# Patient Record
Sex: FEMALE | Race: WHITE | HISPANIC OR LATINO | Employment: FULL TIME | URBAN - METROPOLITAN AREA
[De-identification: names, ages, dates, MRNs, and addresses within clinical notes are randomized per-mention and may not be internally consistent; named-entity substitution may affect disease eponyms.]

---

## 2024-06-17 PROBLEM — R87.613 PAP SMEAR ABNORMALITY OF CERVIX WITH HGSIL: Status: ACTIVE | Noted: 2024-06-17

## 2024-11-13 ENCOUNTER — TELEPHONE (OUTPATIENT)
Dept: FAMILY MEDICINE | Facility: CLINIC | Age: 64
End: 2024-11-13
Payer: COMMERCIAL

## 2024-11-13 NOTE — TELEPHONE ENCOUNTER
Spoke with pt and confirmed new pt appt. Informed pt to bring all current medications to appt and if pt no shows we will be unable to reschedule them. Pt voiced understanding.

## 2024-11-18 ENCOUNTER — OFFICE VISIT (OUTPATIENT)
Dept: FAMILY MEDICINE | Facility: CLINIC | Age: 64
End: 2024-11-18
Payer: COMMERCIAL

## 2024-11-18 VITALS
HEIGHT: 61 IN | BODY MASS INDEX: 29.07 KG/M2 | WEIGHT: 154 LBS | OXYGEN SATURATION: 99 % | DIASTOLIC BLOOD PRESSURE: 76 MMHG | SYSTOLIC BLOOD PRESSURE: 130 MMHG | HEART RATE: 99 BPM

## 2024-11-18 DIAGNOSIS — R00.0 ELEVATED PULSE RATE: ICD-10-CM

## 2024-11-18 DIAGNOSIS — Z76.89 ENCOUNTER TO ESTABLISH CARE: Primary | ICD-10-CM

## 2024-11-18 DIAGNOSIS — R03.0 ELEVATED BLOOD-PRESSURE READING WITHOUT DIAGNOSIS OF HYPERTENSION: ICD-10-CM

## 2024-11-18 DIAGNOSIS — F41.0 PANIC: ICD-10-CM

## 2024-11-18 DIAGNOSIS — R73.09 ELEVATED GLUCOSE: ICD-10-CM

## 2024-11-18 DIAGNOSIS — F32.A ANXIETY AND DEPRESSION: ICD-10-CM

## 2024-11-18 DIAGNOSIS — E78.2 MIXED HYPERLIPIDEMIA: ICD-10-CM

## 2024-11-18 DIAGNOSIS — M26.609 TMJ (TEMPOROMANDIBULAR JOINT DISORDER): ICD-10-CM

## 2024-11-18 DIAGNOSIS — F41.9 ANXIETY AND DEPRESSION: ICD-10-CM

## 2024-11-18 DIAGNOSIS — L30.9 ECZEMA, UNSPECIFIED TYPE: ICD-10-CM

## 2024-11-18 RX ORDER — ESCITALOPRAM OXALATE 10 MG/1
10 TABLET ORAL DAILY
Qty: 90 TABLET | Refills: 3 | Status: SHIPPED | OUTPATIENT
Start: 2024-11-18 | End: 2025-11-18

## 2024-11-18 RX ORDER — CYCLOBENZAPRINE HCL 5 MG
5 TABLET ORAL NIGHTLY PRN
Qty: 20 TABLET | Refills: 2 | Status: SHIPPED | OUTPATIENT
Start: 2024-11-18

## 2024-11-18 RX ORDER — CLONAZEPAM 0.5 MG/1
0.5 TABLET ORAL 2 TIMES DAILY PRN
Qty: 40 TABLET | Refills: 0 | Status: SHIPPED | OUTPATIENT
Start: 2024-11-18 | End: 2025-11-18

## 2024-11-18 RX ORDER — DESONIDE 0.5 MG/G
CREAM TOPICAL 2 TIMES DAILY
Qty: 15 G | Refills: 2 | Status: SHIPPED | OUTPATIENT
Start: 2024-11-18

## 2024-11-18 NOTE — PATIENT INSTRUCTIONS
Pascual King,     If you are due for any health screening(s) below please notify me so we can arrange them to be ordered and scheduled. Most healthy patients at your age complete them, but you are free to accept or refuse.     If you can't do it, I'll definitely understand. If you can, I'd certainly appreciate it!    Tests to Keep You Healthy    Mammogram: Met on 7/26/2024  Colon Cancer Screening: DUE      Its time for your colon cancer screening     Colorectal cancer is one of the leading causes of cancer death for men and women but it doesnt have to be. Screenings can prevent colorectal cancer or find it early enough to treat and cure the disease.     Our records indicate that you may be overdue for colon cancer screening. A colonoscopy or stool screening test can help identify patients at risk for developing colon cancer. Cancer screenings save lives, so schedule yours today to stay healthy.     A colonoscopy is the preferred test for detecting colon cancer. It is needed only once every 10 years if results are negative. While you are sedated, a flexible, lighted tube with a tiny camera is inserted into the rectum and advanced through the colon to look for cancers.     An alternative screening test that is used at home and returned to the lab may also be used. It detects hidden blood in bowel movements which could indicate cancer in the colon. If results are positive, you will need a colonoscopy to determine if the blood is a sign of cancer. This type of follow up (diagnostic) colonoscopy usually requires additional copays as required by your insurance provider.     If you recently had your colon cancer screening performed outside of [facility], please let your Health care team know so that they can update your health record. Please contact your PCP if you have any questions.    Were here to help you quit smoking     Our records indicated that you are still smoking. One of the best things you can do for  your health is to stop smoking and we are here to help.     Talk with your provider about our Smoking Cessation Program and how we can support you on your journey.

## 2024-11-19 PROBLEM — E78.2 MIXED HYPERLIPIDEMIA: Status: ACTIVE | Noted: 2024-11-19

## 2024-11-19 PROBLEM — F41.9 ANXIETY AND DEPRESSION: Status: ACTIVE | Noted: 2024-11-19

## 2024-11-19 PROBLEM — L30.9 ECZEMA: Status: ACTIVE | Noted: 2024-11-19

## 2024-11-19 PROBLEM — R00.0 ELEVATED PULSE RATE: Status: ACTIVE | Noted: 2024-11-19

## 2024-11-19 PROBLEM — F32.A ANXIETY AND DEPRESSION: Status: ACTIVE | Noted: 2024-11-19

## 2024-11-19 PROBLEM — R03.0 ELEVATED BLOOD-PRESSURE READING WITHOUT DIAGNOSIS OF HYPERTENSION: Status: ACTIVE | Noted: 2024-11-19

## 2024-11-19 PROBLEM — R73.09 ELEVATED GLUCOSE: Status: ACTIVE | Noted: 2024-11-19

## 2024-11-19 PROBLEM — M26.609 TMJ (TEMPOROMANDIBULAR JOINT DISORDER): Status: ACTIVE | Noted: 2024-11-19

## 2024-11-19 PROBLEM — F41.0 PANIC: Status: ACTIVE | Noted: 2024-11-19

## 2024-11-19 NOTE — PROGRESS NOTES
"  SUBJECTIVE:    Patient ID: Fernando Parmar is a 64 y.o. female.    Chief Complaint: Establish Care (No bottles//Pt here to establish care//discuss anxiety and nerves and medications//Just had mammo on 7/26/2024//discuss colo//declines flu vacc//JL)    HPI  History of Present Illness    CHIEF COMPLAINT:  Fernando presents today for follow-up of anxiety and depression.    ANXIETY AND DEPRESSION:  She reports a long-standing history of anxiety and underlying depression, exacerbated by the loss of her son to addiction four years ago and previously caring for her mother. Symptoms include racing heart, changes in handwriting, difficulty sleeping, chewing the inside of her mouth, teeth grinding, and occasionally spitting blood. She wakes up over an hour earlier than necessary to mentally prepare for work. Previously, she has taken Zoloft and Lexapro, as well as Klonopin or Xanax for acute symptoms, but is currently not on any medication. She expresses that she can no longer manage symptoms independently, and deep breathing exercises are no longer sufficient.    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 She describes her job as stressful, accentuating her other symptoms. She acknowledges contributing to her own stress by researching extensively and going in-depth with her work. The impact on her work and home life is described as "very, extremely" difficult, occurring daily.    FAMILY HISTORY:  She recently housed her daughter and two grandchildren for six months due to her daughter's marital separation, which has since been resolved.    SURGICAL HISTORY:  She underwent a total hysterectomy in June performed by Dr. Navya Jordan due to a concerning spot. Follow-up appointments are scheduled every six months.    PHYSICAL SYMPTOMS:  She reports tension in her shoulders and across her shoulder blades, attributed to prolonged phone and " computer use. She experiences facial pain associated with TMJ and recent headaches. She also reports night sweats and frequent urination, which increases with nervousness.    SOCIAL HISTORY:  She lives with a friend and smokes about 4-5 cigarettes a day, primarily in the morning on the way to work, on the way home, and before bed. She has reduced her caffeine intake, particularly [employer].    DIET AND NUTRITION:  She reports recent weight loss of 12 lbs with decreased appetite and irregular eating habits. She drinks soda throughout the workday and typically consumes a single chicken thigh for dinner.    MEDICAL HISTORY:  She has a history of high cholesterol, ranging in the 200-300s even with medication. She had ankle surgery in 2000 and reports a localized skin rash on her ankle.      ROS:  General: -fever, -chills, -fatigue, -weight gain, +weight loss  Eyes: -vision changes, -redness, -discharge  ENT: -ear pain, -nasal congestion, -sore throat  Cardiovascular: -chest pain, -palpitations, -lower extremity edema  Respiratory: -cough, -shortness of breath  Gastrointestinal: -abdominal pain, -nausea, -vomiting, -diarrhea, -constipation, -blood in stool  Genitourinary: -dysuria, -hematuria, -frequency  Musculoskeletal: -joint pain, +muscle pain  Skin: +rash, -lesion  Neurological: +headache, -dizziness, -numbness, -tingling  Psychiatric: -anxiety, -depression, +sleep difficulty  Endocrine: +excessive sweating         Admission on 06/17/2024, Discharged on 06/17/2024   Component Date Value Ref Range Status    Sodium 06/17/2024 137  136 - 145 mmol/L Final    Potassium 06/17/2024 4.0  3.5 - 5.1 mmol/L Final    Chloride 06/17/2024 104  95 - 110 mmol/L Final    CO2 06/17/2024 25  22 - 31 mmol/L Final    Glucose 06/17/2024 127 (H)  70 - 110 mg/dL Final    BUN 06/17/2024 7  7 - 18 mg/dL Final    Creatinine 06/17/2024 0.75  0.50 - 1.40 mg/dL Final    Calcium 06/17/2024 8.4  8.4 - 10.2 mg/dL Final    Anion Gap 06/17/2024 8  5  - 12 mmol/L Final    eGFR 06/17/2024 >60  >60 mL/min/1.73 m^2 Final    WBC 06/17/2024 7.70  3.90 - 12.70 K/uL Final    RBC 06/17/2024 4.18  4.00 - 5.40 M/uL Final    Hemoglobin 06/17/2024 12.7  12.0 - 16.0 g/dL Final    Hematocrit 06/17/2024 39.0  37.0 - 48.5 % Final    MCV 06/17/2024 93  82 - 98 fL Final    MCH 06/17/2024 30.4  27.0 - 31.0 pg Final    MCHC 06/17/2024 32.6  32.0 - 36.0 g/dL Final    RDW 06/17/2024 13.5  11.5 - 14.5 % Final    Platelets 06/17/2024 250  150 - 450 K/uL Final    MPV 06/17/2024 10.4  9.2 - 12.9 fL Final    Immature Granulocytes 06/17/2024 0.3  0.0 - 0.5 % Final    Gran # (ANC) 06/17/2024 7.1  1.8 - 7.7 K/uL Final    Immature Grans (Abs) 06/17/2024 0.02  0.00 - 0.04 K/uL Final    Lymph # 06/17/2024 0.5 (L)  1.0 - 4.8 K/uL Final    Mono # 06/17/2024 0.1 (L)  0.3 - 1.0 K/uL Final    Eos # 06/17/2024 0.0  0.0 - 0.5 K/uL Final    Baso # 06/17/2024 0.02  0.00 - 0.20 K/uL Final    nRBC 06/17/2024 0  0 /100 WBC Final    Gran % 06/17/2024 92.1 (H)  38.0 - 73.0 % Final    Lymph % 06/17/2024 5.8 (L)  18.0 - 48.0 % Final    Mono % 06/17/2024 1.4 (L)  4.0 - 15.0 % Final    Eosinophil % 06/17/2024 0.1  0.0 - 8.0 % Final    Basophil % 06/17/2024 0.3  0.0 - 1.9 % Final    Differential Method 06/17/2024 Automated   Final   Hospital Outpatient Visit on 06/14/2024   Component Date Value Ref Range Status    WBC 06/14/2024 5.72  3.90 - 12.70 K/uL Final    RBC 06/14/2024 4.62  4.00 - 5.40 M/uL Final    Hemoglobin 06/14/2024 13.9  12.0 - 16.0 g/dL Final    Hematocrit 06/14/2024 42.2  37.0 - 48.5 % Final    MCV 06/14/2024 91  82 - 98 fL Final    MCH 06/14/2024 30.1  27.0 - 31.0 pg Final    MCHC 06/14/2024 32.9  32.0 - 36.0 g/dL Final    RDW 06/14/2024 13.3  11.5 - 14.5 % Final    Platelets 06/14/2024 275  150 - 450 K/uL Final    MPV 06/14/2024 10.8  9.2 - 12.9 fL Final    Immature Granulocytes 06/14/2024 0.3  0.0 - 0.5 % Final    Gran # (ANC) 06/14/2024 3.4  1.8 - 7.7 K/uL Final    Immature Grans (Abs)  06/14/2024 0.02  0.00 - 0.04 K/uL Final    Lymph # 06/14/2024 1.8  1.0 - 4.8 K/uL Final    Mono # 06/14/2024 0.4  0.3 - 1.0 K/uL Final    Eos # 06/14/2024 0.1  0.0 - 0.5 K/uL Final    Baso # 06/14/2024 0.04  0.00 - 0.20 K/uL Final    nRBC 06/14/2024 0  0 /100 WBC Final    Gran % 06/14/2024 59.3  38.0 - 73.0 % Final    Lymph % 06/14/2024 30.6  18.0 - 48.0 % Final    Mono % 06/14/2024 7.0  4.0 - 15.0 % Final    Eosinophil % 06/14/2024 2.1  0.0 - 8.0 % Final    Basophil % 06/14/2024 0.7  0.0 - 1.9 % Final    Differential Method 06/14/2024 Automated   Final    Sodium 06/14/2024 138  136 - 145 mmol/L Final    Potassium 06/14/2024 4.3  3.5 - 5.1 mmol/L Final    Chloride 06/14/2024 103  95 - 110 mmol/L Final    CO2 06/14/2024 26  22 - 31 mmol/L Final    Glucose 06/14/2024 112 (H)  70 - 110 mg/dL Final    BUN 06/14/2024 7  7 - 18 mg/dL Final    Creatinine 06/14/2024 0.79  0.50 - 1.40 mg/dL Final    Calcium 06/14/2024 9.6  8.4 - 10.2 mg/dL Final    Total Protein 06/14/2024 7.5  6.0 - 8.4 g/dL Final    Albumin 06/14/2024 4.5  3.5 - 5.2 g/dL Final    Total Bilirubin 06/14/2024 0.4  0.2 - 1.3 mg/dL Final    Alkaline Phosphatase 06/14/2024 66  38 - 145 U/L Final    AST 06/14/2024 22  14 - 36 U/L Final    ALT 06/14/2024 20  0 - 35 U/L Final    Anion Gap 06/14/2024 9  5 - 12 mmol/L Final    eGFR 06/14/2024 >60  >60 mL/min/1.73 m^2 Final    Group & Rh 06/14/2024 A NEG   Final    Indirect [place] GEL 06/14/2024 NEG   Final    Specimen Outdate 06/14/2024 06/17/2024 23:59   Final    QRS Duration 06/14/2024 72  ms Final    [facility] QTC Calculation 06/14/2024 453  ms Final       Past Medical History:   Diagnosis Date    Anxiety     HGSIL (high grade squamous intraepithelial lesion) on Pap smear of cervix     Hyperlipidemia      Social History     Socioeconomic History    Marital status:    Tobacco Use    Smoking status: Every Day     Current packs/day: 0.25     Average packs/day: 0.3 packs/day for 44.9 years (11.2 ttl pk-yrs)      Types: Cigarettes     Start date: 1980    Smokeless tobacco: Never   Substance and Sexual Activity    Alcohol use: Not Currently    Drug use: Never     Past Surgical History:   Procedure Laterality Date    ACDF      CERVICAL BIOPSY  W/ LOOP ELECTRODE EXCISION       SECTION      2    CYSTOSCOPY N/A 2024    Procedure: CYSTOSCOPY;  Surgeon: Navya Luque MD;  Location: [facility];  Service: Gynecology Oncology;  Laterality: N/A;    OPEN REDUCTION AND INTERNAL FIXATION (ORIF) OF INJURY OF ANKLE Left     ROBOT-ASSISTED LAPAROSCOPIC HYSTERECTOMY N/A 2024    Procedure: ROBOTIC HYSTERECTOMY;  Surgeon: Navya Luque MD;  Location: [facility] OR;  Service: Gynecology Oncology;  Laterality: N/A;    ROBOT-ASSISTED LAPAROSCOPIC SALPINGO-OOPHORECTOMY Bilateral 2024    Procedure: ROBOTIC SALPINGO-OOPHORECTOMY;  Surgeon: Navya Luque MD;  Location: [facility] OR;  Service: Gynecology Oncology;  Laterality: Bilateral;    ROBOT-ASSISTED LYMPHADENECTOMY N/A 2024    Procedure: ROBOTIC LYMPHADENECTOMY;  Surgeon: Navya Luque MD;  Location: [facility] OR;  Service: Gynecology Oncology;  Laterality: N/A;     No family history on file.    The CVD Risk score (D'Agostino, et al., 2008) failed to calculate for the following reasons:    Cannot find a previous HDL lab    Cannot find a previous total cholesterol lab    Tests to Keep You Healthy    Mammogram: Met on 2024  Colon Cancer Screening: DUE      Review of patient's allergies indicates:  No Known Allergies    Current Outpatient Medications:     ALPRAZolam (XANAX) 1 MG tablet, Take 1 mg by mouth every 6 (six) hours as needed. (Patient not taking: Reported on 2024), Disp: , Rfl:     clonazePAM (KLONOPIN) 0.5 MG tablet, Take 1 tablet (0.5 mg total) by mouth 2 (two) times daily as needed for Anxiety., Disp: 40 tablet, Rfl: 0    cyclobenzaprine (FLEXERIL) 5 MG tablet, Take 1 tablet (5 mg total) by mouth nightly as needed for Muscle spasms  "(TMJ)., Disp: 20 tablet, Rfl: 2    desonide (DESOWEN) 0.05 % cream, Apply topically 2 (two) times daily., Disp: 15 g, Rfl: 2    EScitalopram oxalate (LEXAPRO) 10 MG tablet, Take 1 tablet (10 mg total) by mouth once daily., Disp: 90 tablet, Rfl: 3    HYDROcodone-acetaminophen (NORCO) 5-325 mg per tablet, Take 1 tablet by mouth every 6 (six) hours as needed for Pain. (Patient not taking: Reported on 11/18/2024), Disp: 20 tablet, Rfl: 0    senna-docusate 8.6-50 mg (PERICOLACE) 8.6-50 mg per tablet, Take 1 tablet by mouth 2 (two) times daily. (Patient not taking: Reported on 11/18/2024), Disp: , Rfl:     Review of Systems        Objective:      Vitals:    11/18/24 1108   BP: 130/76   Pulse: 99   SpO2: 99%   Weight: 69.9 kg (154 lb)   Height: 5' 1.25" (1.556 m)     Physical Exam  Physical Exam    Constitutional: In no acute distress. Normal appearance. Well-developed. Not ill-appearing.  HENT: Normocephalic. Atraumatic. External ears normal bilaterally. Nose normal. Normal lips. Oropharynx clear.  Eyes: No scleral icterus. Pupils are equal, round, and reactive to light.  Neck: No thyromegaly. No carotid bruit.  Cardiovascular: Normal rate and regular rhythm. Radial pulses are 2+ bilaterally. Normal heart sounds. No murmur heard.  Pulmonary: Pulmonary effort is normal. Normal breath sounds.  Abdomen:  Abdomen is soft. No abdominal tenderness.  Musculoskeletal: Normal range of motion at all joints.  No lower extremity edema bilaterally.  Skin: Warm. Dry. Capillary refill takes less than 2 seconds.  Neurological: No focal deficit present. Alert and oriented to person, place, and time. No cranial nerve deficit. Sensation intact. No motor weakness. Gait is intact.  Psychiatric: Attention normal. Mood normal. Tearful at times when talking about her son. Speech normal. Behavior is cooperative. No homicidal ideation. No suicidal ideation.  Vitals: Heart rate elevated.           Assessment:       1. Encounter to establish care  "   2. Elevated blood-pressure reading without diagnosis of hypertension    3. Mixed hyperlipidemia    4. Anxiety and depression    5. Panic    6. Elevated glucose    7. Elevated pulse rate    8. Eczema, unspecified type    9. TMJ (temporomandibular joint disorder)         Plan:       Encounter to establish care  Assessment & Plan    Patient presenting with severe anxiety and depression symptoms  Considering restarting escitalopram (Lexapro) for daily management of anxiety/depression symptoms  Adding clonazepam for acute anxiety episodes  Addressing TMJ-related pain with cyclobenzaprine (Flexeril)  Evaluating cholesterol levels and diabetes risk due to previously elevated blood sugar  Assessing thyroid function due to anxiety symptoms and elevated heart rate  Low pack-year smoking history (11 pack-years) does not warrant lung cancer screening at this time  Noted eczema-like rash on ankle, over a previous surgery scar    Elevated blood-pressure reading without diagnosis of hypertension  Labs for evaluation of health/monitoring of condition.   -     CBC Auto Differential; Future; Expected date: 11/18/2024  -     Comprehensive Metabolic Panel; Future; Expected date: 11/18/2024  -     T4, Free; Future; Expected date: 11/18/2024  -     TSH; Future; Expected date: 11/18/2024  -     Urinalysis, Reflex to Urine Culture Urine, Clean Catch; Future; Expected date: 11/18/2024    Mixed hyperlipidemia  Labs for evaluation of health/monitoring of condition.   -     CBC Auto Differential; Future; Expected date: 11/18/2024  -     Comprehensive Metabolic Panel; Future; Expected date: 11/18/2024  -     Lipid Panel; Future; Expected date: 11/18/2024    Anxiety and depression  GENERALIZED ANXIETY DISORDER AND MAJOR DEPRESSIVE DISORDER:  - Started escitalopram (Lexapro) 10 mg daily in the morning for anxiety/depression.  - Started clonazepam as needed for acute anxiety episodes.  - Contact the office if experiencing worsening mood,  suicidal thoughts, increased anxiety, irritability, or agitation within the first 2 weeks of starting escitalopram.  -     Comprehensive Metabolic Panel; Future; Expected date: 11/18/2024  -     T4, Free; Future; Expected date: 11/18/2024  -     TSH; Future; Expected date: 11/18/2024  -     EScitalopram oxalate (LEXAPRO) 10 MG tablet; Take 1 tablet (10 mg total) by mouth once daily.  Dispense: 90 tablet; Refill: 3    Panic  -     clonazePAM (KLONOPIN) 0.5 MG tablet; Take 1 tablet (0.5 mg total) by mouth 2 (two) times daily as needed for Anxiety.  Dispense: 40 tablet; Refill: 0    Elevated glucose  Labs for evaluation of health/monitoring of condition.   -     Comprehensive Metabolic Panel; Future; Expected date: 11/18/2024  -     Hemoglobin A1C; Future; Expected date: 11/18/2024    Elevated pulse rate  Labs for evaluation of health/monitoring of condition.   -     CBC Auto Differential; Future; Expected date: 11/18/2024  -     Comprehensive Metabolic Panel; Future; Expected date: 11/18/2024  -     T4, Free; Future; Expected date: 11/18/2024  -     TSH; Future; Expected date: 11/18/2024    Eczema, unspecified type  DERMATITIS:  - Recommend using thick emollient (e.g., Lubriderm) on dry skin areas.  - Started topical steroid cream for ankle rash, apply twice daily for 1 week.  -     desonide (DESOWEN) 0.05 % cream; Apply topically 2 (two) times daily.  Dispense: 15 g; Refill: 2    TMJ (temporomandibular joint disorder)  -     cyclobenzaprine (FLEXERIL) 5 MG tablet; Take 1 tablet (5 mg total) by mouth nightly as needed for Muscle spasms (TMJ).  Dispense: 20 tablet; Refill: 2  TEMPOROMANDIBULAR JOINT DISORDER:  - Started cyclobenzaprine (Flexeril) as needed at bedtime for TMJ-related pain.    NUTRITION AND APPETITE:  - Educated on the importance of balanced nutrition, especially with decreased appetite.  - Fernando to focus on consuming nutritionally balanced meals, especially when appetite is decreased.    PREVENTIVE  CARE:  - Informed about colon cancer screening options (colonoscopy and Cologuard).    LABS:  - Ordered CBC, CMP, lipid panel, hemoglobin A1C, and thyroid function tests.    FOLLOW-UP:  - Follow up in 6-8 weeks (2nd or 3rd week of January) to assess response to new medications.         Follow up in about 8 weeks (around 1/13/2025) for new meds in person or virtual.        This note was generated with the assistance of ambient listening technology. Verbal consent was obtained by the patient and accompanying visitor(s) for the recording of patient appointment to facilitate this note. I attest to having reviewed and edited the generated note for accuracy, though some syntax or spelling errors may persist. Please contact the author of this note for any clarification.      11/19/2024 Breana Nelson

## 2024-11-26 RX ORDER — ROSUVASTATIN CALCIUM 10 MG/1
10 TABLET, COATED ORAL DAILY
Qty: 90 TABLET | Refills: 3 | Status: SHIPPED | OUTPATIENT
Start: 2024-11-26 | End: 2025-11-26

## 2024-11-26 NOTE — TELEPHONE ENCOUNTER
Spoke with pt verbatim per Breana. Pt voiced understanding.  Pt would like medication to go to [facility] on [address]. RX order set up. Please add dx code.

## 2024-11-26 NOTE — TELEPHONE ENCOUNTER
----- Message from CAROL Aldridge sent at 11/25/2024  5:26 PM CST -----  Cholesterol too high. Estimated possible risk of having a cardiac event like heart attack or stroke in next 10 years is 11.9%. ideally we would like to get this to below 7.5%. I want to put her back on medication. I am sending rosuvastatin 10mg.   Blood counts are fine.  Kidneys, liver and electrolytes are all good. Thyroid is in normal range.  Hemoglobin A1C shows borderline prediabetic. Needs to watch refined sugars. This can also help with cholesterol.

## 2024-12-13 ENCOUNTER — TELEPHONE (OUTPATIENT)
Dept: FAMILY MEDICINE | Facility: CLINIC | Age: 64
End: 2024-12-13
Payer: COMMERCIAL

## 2024-12-13 DIAGNOSIS — F41.0 PANIC: ICD-10-CM

## 2024-12-13 RX ORDER — CLONAZEPAM 0.5 MG/1
0.5 TABLET ORAL 2 TIMES DAILY PRN
Qty: 40 TABLET | Refills: 2 | Status: SHIPPED | OUTPATIENT
Start: 2024-12-13 | End: 2025-12-13

## 2024-12-13 NOTE — TELEPHONE ENCOUNTER
----- Message from Lee Ann sent at 12/13/2024 10:11 AM CST -----  Patient needs to speak with nigel about a prescription if possible.   [phone]

## 2024-12-13 NOTE — TELEPHONE ENCOUNTER
Spoke with pt who states that she need a refill on her Klonopin.     Last OV 11/18/2024   Upcoming OV 1/14/2025    Per  last dispensed 11/18/2024 for 20 days    Rx order set up.

## 2024-12-17 ENCOUNTER — TELEPHONE (OUTPATIENT)
Dept: FAMILY MEDICINE | Facility: CLINIC | Age: 64
End: 2024-12-17
Payer: COMMERCIAL

## 2025-01-14 ENCOUNTER — OFFICE VISIT (OUTPATIENT)
Dept: FAMILY MEDICINE | Facility: CLINIC | Age: 65
End: 2025-01-14
Payer: COMMERCIAL

## 2025-01-14 VITALS
OXYGEN SATURATION: 100 % | HEIGHT: 61 IN | WEIGHT: 162 LBS | BODY MASS INDEX: 30.58 KG/M2 | HEART RATE: 97 BPM | SYSTOLIC BLOOD PRESSURE: 116 MMHG | DIASTOLIC BLOOD PRESSURE: 64 MMHG

## 2025-01-14 DIAGNOSIS — F41.9 ANXIETY AND DEPRESSION: Primary | ICD-10-CM

## 2025-01-14 DIAGNOSIS — Z87.891 PERSONAL HISTORY OF NICOTINE DEPENDENCE: ICD-10-CM

## 2025-01-14 DIAGNOSIS — R03.0 ELEVATED BLOOD-PRESSURE READING WITHOUT DIAGNOSIS OF HYPERTENSION: ICD-10-CM

## 2025-01-14 DIAGNOSIS — F32.A ANXIETY AND DEPRESSION: Primary | ICD-10-CM

## 2025-01-14 DIAGNOSIS — Z12.11 ENCOUNTER FOR SCREENING FOR MALIGNANT NEOPLASM OF COLON: ICD-10-CM

## 2025-01-14 DIAGNOSIS — E78.2 MIXED HYPERLIPIDEMIA: ICD-10-CM

## 2025-01-14 DIAGNOSIS — F41.0 PANIC: ICD-10-CM

## 2025-01-14 DIAGNOSIS — M26.609 TMJ (TEMPOROMANDIBULAR JOINT DISORDER): ICD-10-CM

## 2025-01-14 PROCEDURE — 3008F BODY MASS INDEX DOCD: CPT | Mod: CPTII,S$GLB,,

## 2025-01-14 PROCEDURE — 99214 OFFICE O/P EST MOD 30 MIN: CPT | Mod: S$GLB,,,

## 2025-01-14 PROCEDURE — 1160F RVW MEDS BY RX/DR IN RCRD: CPT | Mod: CPTII,S$GLB,,

## 2025-01-14 PROCEDURE — 1159F MED LIST DOCD IN RCRD: CPT | Mod: CPTII,S$GLB,,

## 2025-01-14 PROCEDURE — 3078F DIAST BP <80 MM HG: CPT | Mod: CPTII,S$GLB,,

## 2025-01-14 PROCEDURE — 3074F SYST BP LT 130 MM HG: CPT | Mod: CPTII,S$GLB,,

## 2025-01-14 RX ORDER — CLONAZEPAM 0.5 MG/1
0.5 TABLET ORAL 3 TIMES DAILY PRN
Qty: 90 TABLET | Refills: 2 | Status: SHIPPED | OUTPATIENT
Start: 2025-01-14 | End: 2026-01-14

## 2025-01-14 NOTE — PATIENT INSTRUCTIONS
Pascual King,     If you are due for any health screening(s) below please notify me so we can arrange them to be ordered and scheduled. Most healthy patients at your age complete them, but you are free to accept or refuse.     If you can't do it, I'll definitely understand. If you can, I'd certainly appreciate it!    Tests to Keep You Healthy    Mammogram: Met on 7/26/2024  Colon Cancer Screening: DUE  Tobacco Cessation: NO      Its time for your colon cancer screening     Colorectal cancer is one of the leading causes of cancer death for men and women but it doesnt have to be. Screenings can prevent colorectal cancer or find it early enough to treat and cure the disease.     Our records indicate that you may be overdue for colon cancer screening. A colonoscopy or stool screening test can help identify patients at risk for developing colon cancer. Cancer screenings save lives, so schedule yours today to stay healthy.     A colonoscopy is the preferred test for detecting colon cancer. It is needed only once every 10 years if results are negative. While you are sedated, a flexible, lighted tube with a tiny camera is inserted into the rectum and advanced through the colon to look for cancers.     An alternative screening test that is used at home and returned to the lab may also be used. It detects hidden blood in bowel movements which could indicate cancer in the colon. If results are positive, you will need a colonoscopy to determine if the blood is a sign of cancer. This type of follow up (diagnostic) colonoscopy usually requires additional copays as required by your insurance provider.     If you recently had your colon cancer screening performed outside of [facility], please let your Health care team know so that they can update your health record. Please contact your PCP if you have any questions.    Were here to help you quit smoking     Our records indicated that you are still smoking. One of the best  things you can do for your health is to stop smoking and we are here to help.     Talk with your provider about our Smoking Cessation Program and how we can support you on your journey.         Why and How To Start an Anti-Inflammatory Diet    From [facility] February 2, 2022 / Nutrition        A little bit of inflammation contributes to healing, but when inflammation becomes chronic, it can trigger disease processes. Chronic inflammation can damage your heart, brain and other organs, and it plays a role in nearly every major illness, including cancer, heart disease, Alzheimers disease and depression.    Just like inflammation happens after an injury, that same process can happen within your body, says registered dietitian Sybil Santacruz, RD, LD. Certain foods and health conditions can cause inflammation.    But the food we eat -- and dont eat -- can soothe and even prevent that inflammation. Neeta explains the health benefits of an anti-inflammatory diet, as well as where to start, what to stop eating and how to tell if its working.    Who should try an anti-inflammatory diet?  Everyones inflammatory triggers are different, so there are a few reasons you might experience inflammation. Neeta delves deeper.    Chronic illness  If youre living with a chronic health condition, you may be living with chronic inflammation, too. Conditions associated with inflammation include:    Crohns disease.  Heart disease.  High blood pressure.  Irritable bowel syndrome.  Multiple sclerosis.  Obesity.  Psoriasis.  Rheumatoid arthritis.  Type 1 diabetes.  Ulcerative colitis.  Osteoarthritis  We know of some general associations, Neeta says. Refined starches and processed meats are not good for people with heart disease (or anyone); gluten and dairy can further inflame bowel disorders; and nightshades can be inflammatory for arthritis. But each person needs to find their personal triggers.    Food  sensitivities  Even if you dont have a chronic condition, you can experience inflammation when you eat foods that youre sensitive to.    When you have an immune response to a food, your antibodies rise, which can cause inflammation, Neeta explains. Your body basically sees that food as a foreign body and starts working against it.    But foods that cause inflammation in one person might not cause it in others -- the way, for example, that you can enjoy the occasional slice of pizza without issue, while doing so causes severe inflammation in your friend who has a gluten sensitivity.    Its not a perfect example, though! Keep in mind that although you may not experience a physical sign of inflammation, all processed foods can lead to internal inflammation. So keep them to a minimum, even if you dont have a particular sensitivity.    How do you know if youre experiencing inflammation?  This is a tricky one! You might not even realize youre experiencing this type of hidden inflammation within your body, although some physical signs can clue you in to it.    You could have redness, puffiness, skin rashes, swelling in your hands and feet, she says. You can also experience abdominal distention, when your pants feel tight around your waist.    Other clues can include fatigue, weight gain, achy joints and muscles, headaches and gastrointestinal issues. You may also be more prone to getting colds and the flu, and when you do get them, they may linger for weeks.    How to start an anti-inflammatory diet  The foods you eat (and the ones you avoid) can help soothe and even prevent inflammation by quashing your bodys inflammatory responses. But because everyones inflammatory triggers are different, theres no one-size-fits-all anti-inflammatory diet.    The term anti-inflammatory diet doesnt refer to a specific diet regimen but to an overall style of eating, Neeta says. There are, however, some guidelines  to follow to eat in a way that reduces the likelihood of inflammation.    1. Scale way back on processed foods  The first key to minimizing inflammation is cutting foods that cause it. An anti-inflammatory diet is one that includes minimally processed foods, Neeta says. That typically means staying away from anything that comes in a box or a bag, or anything that has a laundry list of ingredients -- especially if they start with sugar, salt or a processed oil and include ingredients you dont recognize.    Examples include:    Sweets, like commercial baked goods, pre-packaged desserts, ice cream and candy.  Snack foods, like potato chips and microwave popcorn.  Processed meats, including charles, sausage, hot dogs, bologna, pepperoni and salami.  Processed cheeses, like cayla cheese dip and American cheese slices.  Sugary beverages, including soda and sports drinks.  Fried foods, like fried chicken and French fries.  Even so-called healthy snacks like granola bars, trail mix and baked chips can have a lot of processed ingredients, including added sodium and sugar.    Theres no real nutritional benefit to them, so youre not lacking anything when you cut them out, Neeta says.    2. Focus on whole foods  When youve cut out the processed stuff, whats left? Whole foods, of course!    A whole food is a one-ingredient food, an entire entity: an apple, an orange, a cucumber, Neeta says. In addition to fruits and vegetables, other examples include:    Brown or wild rice.  Chicken/turkey breast.  Eggs.  Fish (especially oily fish, such as salmon, tuna, herring or mackerel).  Legumes, like dried beans and peas.  Nuts and seeds.  Oats.  In short, if you can find it in nature, its probably a whole food.    So do any processed foods make the grade? Foods that have more than one ingredient can still be made up of whole foods -- for example, store-bought hummus, dried fruit and nut snack mix or a pasta sauce,  Neeta says. The smith is to always review the ingredient list.    That means choosing breads and pastas that are minimally processed, minimally preserved and made with whole grains.    3. Try a diet proven to reduce inflammation  Again, theres no one-size-fits-all anti-inflammatory diet. But two styles of eating have been shown to help: the Mediterranean Diet and the DASH Diet.    Positive research reports that these diets are successful in reducing inflammation, as well as cholesterol, weight, blood pressure and blood sugar, Neeta says. She explains each of them.     The Mediterranean Diet  Thought to be the heart-healthiest of diets, the Mediterranean Diet is a style of eating popular among people who live [place]. A foundation of this diet is fish high in omega-3 fatty acid, which has been proven to reduce inflammation.    The Mediterranean Diet has been shown to be anti-inflammatory because of its focus on whole foods and omega-3 fatty acids, Neeta says. It also eliminates processed oils, like cottonseed and soybean oil, which are found in many ultra-processed foods.    The DASH Diet  DASH, which stands for Dietary Approaches to Stop Hypertension, is a diet designed to reduce high blood pressure. This diet has been shown to reduce inflammation, probably because it reduces blood pressure and promotes weight loss, Neeta notes. Remember, both high blood pressure and obesity are associated with inflammation.    Like the Mediterranean Diet, the DASH Diet focuses on whole foods and limits protein, sweets and processed foods. But DASH includes a bit more dairy, and it doesnt specifically encourage fish or extra-virgin olive oil.    Is vegetarianism anti-inflammatory?  Though meat can be inflammatory, keep in mind that cutting animal products doesnt necessarily mean eating healthfully.    Eating a plant-based diet can help suppress inflammation, Neeta says, but vegetarian,  pescetarian and even vegan diets can still include foods like potato chips, fries and cookies. To see anti-inflammatory benefits, you have to stick to whole foods.    4. If needed, try an elimination diet  If youve cut out processed foods but still experience symptoms of inflammation, you may need to go a step further.    Finding the right anti-inflammatory diet for you is a matter of personalization and finding the foods that trigger your inflammation, Neeta explains. The best way to start is by trying an elimination diet and slowly cutting out potential trigger foods one by one.    Food sensitivity tests can help identify which foods increase your bodys antibody response, too, which may be helpful if you cant seem to determine a culprit on your own.    Do anti-inflammatory diets work?  The biggest hint that your anti-inflammatory diet is working is if you start feeling better, so keep an eye on your symptoms and look for positive changes to your health.    There are a lot of different ways your body can react to an anti-inflammatory diet, Neeta says. They include:    Clearer skin.  Decreased muscle or joint pain.  Decreased swelling in your hands and feet.  Fewer headaches.  Improved gastrointestinal symptoms (diarrhea, gas, nausea, stomach pain).  Improved sleep.  Less anxiety, stress and/or brain fog.  Less bloating.  Lower blood pressure.  Lower blood sugar.  More energy.  Weight loss.  How long does it take to see results?  Starting an anti-inflammatory diet isnt a magic pill. Your results will vary based on the severity of your intolerance and inflammation.    Drastic changes never lead to long-term success, so give yourself three to six months to make diet changes and to begin to see results, Neeta advises. Begin by making small changes that you know will be impactful, and then slowly continue to add on.    In some cases, if you significantly react to a certain food, you may see results as  soon as two to three weeks after eliminating that food from your diet.    This can be very encouraging and motivating, Neeta says. Patients often tell me that they never realized how bad they felt until they changed their diet and began to feel so much better.

## 2025-01-22 NOTE — PROGRESS NOTES
SUBJECTIVE:    Patient ID: Fernando Parmar is a 64 y.o. female.    Chief Complaint: Follow-up (No bottles//Pt here for follow for new medications. Pt states that they are helping tremendously//would like referral to Dr. Nicholas//declines flu vacc//DANEL)    HPI  History of Present Illness    CHIEF COMPLAINT:  Fernando presents today for follow up of anxiety and reports significant improvement with medication.    ANXIETY AND SLEEP:  She reports significant improvement in anxiety symptoms with current Klonopin regimen, particularly noting resolution of inner body shaking. Sleep has improved markedly, now achieving 4-5 hours per night, which is better than she has experienced in years.    MUSCLE SPASMS:  She reports improvement in facial muscle spasms and facial pain. She occasionally experiences spasms during periods of stress but can control them. She takes Flexeril at night for management.    WEIGHT AND SMOKING:  She reports regaining weight after previously losing 14 lbs following surgery and stress. She notes decreased smoking frequency, attributing this reduction to medication use.    FAMILY HISTORY:  She reports family history of high cholesterol affecting her mother and brother. Her brother has been diagnosed with anxiety.      ROS:  General: -fever, -chills, -fatigue, -weight gain, -weight loss  Eyes: -vision changes, -redness, -discharge  ENT: -ear pain, -nasal congestion, -sore throat  Cardiovascular: -chest pain, -palpitations, -lower extremity edema  Respiratory: -cough, -shortness of breath  Gastrointestinal: -abdominal pain, -nausea, -vomiting, -diarrhea, -constipation, -blood in stool  Genitourinary: -dysuria, -hematuria, -frequency  Musculoskeletal: -joint pain, -muscle pain, +muscle spasms  Skin: -rash, -lesion  Neurological: -headache, -dizziness, -numbness, -tingling  Psychiatric: +anxiety, -depression, -sleep difficulty         Office Visit on 11/18/2024   Component Date Value Ref Range Status     WBC 11/23/2024 5.7  3.8 - 10.8 Thousand/uL Final    RBC 11/23/2024 4.59  3.80 - 5.10 Million/uL Final    Hemoglobin 11/23/2024 14.0  11.7 - 15.5 g/dL Final    Hematocrit 11/23/2024 43.9  35.0 - 45.0 % Final    MCV 11/23/2024 95.6  80.0 - 100.0 fL Final    MCH 11/23/2024 30.5  27.0 - 33.0 pg Final    MCHC 11/23/2024 31.9 (L)  32.0 - 36.0 g/dL Final    RDW 11/23/2024 12.7  11.0 - 15.0 % Final    Platelets 11/23/2024 322  140 - 400 Thousand/uL Final    MPV 11/23/2024 10.8  7.5 - 12.5 fL Final    Neutrophils, Abs 11/23/2024 3,198  1,500 - 7,800 cells/uL Final    Lymph # 11/23/2024 1,721  850 - 3,900 cells/uL Final    Mono # 11/23/2024 371  200 - 950 cells/uL Final    Eos # 11/23/2024 359  15 - 500 cells/uL Final    Baso # 11/23/2024 51  0 - 200 cells/uL Final    Neutrophils Relative 11/23/2024 56.1  % Final    Lymph % 11/23/2024 30.2  % Final    Mono % 11/23/2024 6.5  % Final    Eosinophil % 11/23/2024 6.3  % Final    Basophil % 11/23/2024 0.9  % Final    Glucose 11/23/2024 86  65 - 99 mg/dL Final    BUN 11/23/2024 7  7 - 25 mg/dL Final    Creatinine 11/23/2024 0.86  0.50 - 1.05 mg/dL Final    eGFR 11/23/2024 76  > OR = 60 mL/min/1.73m2 Final    BUN/Creatinine Ratio 11/23/2024 SEE NOTE:  6 - 22 (calc) Final    Sodium 11/23/2024 140  135 - 146 mmol/L Final    Potassium 11/23/2024 4.3  3.5 - 5.3 mmol/L Final    Chloride 11/23/2024 101  98 - 110 mmol/L Final    CO2 11/23/2024 30  20 - 32 mmol/L Final    Calcium 11/23/2024 9.3  8.6 - 10.4 mg/dL Final    Total Protein 11/23/2024 6.8  6.1 - 8.1 g/dL Final    Albumin 11/23/2024 4.3  3.6 - 5.1 g/dL Final    Globulin, Total 11/23/2024 2.5  1.9 - 3.7 g/dL (calc) Final    Albumin/Globulin Ratio 11/23/2024 1.7  1.0 - 2.5 (calc) Final    Total Bilirubin 11/23/2024 0.3  0.2 - 1.2 mg/dL Final    Alkaline Phosphatase 11/23/2024 90  37 - 153 U/L Final    AST 11/23/2024 12  10 - 35 U/L Final    ALT 11/23/2024 12  6 - 29 U/L Final    Hemoglobin A1C 11/23/2024 5.7 (H)  <5.7 % of total Hgb  Final    Cholesterol 2024 302 (H)  <200 mg/dL Final    HDL 2024 46 (L)  > OR = 50 mg/dL Final    Triglycerides 2024 208 (H)  <150 mg/dL Final    LDL Cholesterol 2024 217 (H)  mg/dL (calc) Final    HDL/Cholesterol Ratio 2024 6.6 (H)  <5.0 (calc) Final    Non HDL Chol. (LDL+VLDL) 2024 256 (H)  <130 mg/dL (calc) Final    T4, Free 2024 0.9  0.8 - 1.8 ng/dL Final    TSH 2024 1.03  0.40 - 4.50 mIU/L Final       Past Medical History:   Diagnosis Date    Anxiety     HGSIL (high grade squamous intraepithelial lesion) on Pap smear of cervix     Hyperlipidemia      Social History     Socioeconomic History    Marital status:    Tobacco Use    Smoking status: Every Day     Current packs/day: 0.25     Average packs/day: 0.3 packs/day for 45.1 years (11.3 ttl pk-yrs)     Types: Cigarettes     Start date: 1980    Smokeless tobacco: Never   Substance and Sexual Activity    Alcohol use: Not Currently    Drug use: Never     Social Drivers of Health     Financial Resource Strain: Medium Risk (2025)    Overall Financial Resource Strain (CARDIA)     Difficulty of Paying Living Expenses: Somewhat hard   Food Insecurity: No Food Insecurity (2025)    Hunger Vital Sign     Worried About Running Out of Food in the Last Year: Never true     Ran Out of Food in the Last Year: Never true   Physical Activity: Inactive (2025)    Exercise Vital Sign     Days of Exercise per Week: 0 days     Minutes of Exercise per Session: 0 min   Stress: Stress Concern Present (2025)    [employer] - Occupational Stress Questionnaire     Feeling of Stress : To some extent   Housing Stability: High Risk (2025)    Housing Stability Vital Sign     Unable to Pay for Housing in the Last Year: Yes     Past Surgical History:   Procedure Laterality Date    ACDF      CERVICAL BIOPSY  W/ LOOP ELECTRODE EXCISION       SECTION      2    CYSTOSCOPY N/A  6/17/2024    Procedure: CYSTOSCOPY;  Surgeon: Navya Luque MD;  Location: [facility] OR;  Service: Gynecology Oncology;  Laterality: N/A;    OPEN REDUCTION AND INTERNAL FIXATION (ORIF) OF INJURY OF ANKLE Left     ROBOT-ASSISTED LAPAROSCOPIC HYSTERECTOMY N/A 6/17/2024    Procedure: ROBOTIC HYSTERECTOMY;  Surgeon: Navya Luque MD;  Location: [facility] OR;  Service: Gynecology Oncology;  Laterality: N/A;    ROBOT-ASSISTED LAPAROSCOPIC SALPINGO-OOPHORECTOMY Bilateral 6/17/2024    Procedure: ROBOTIC SALPINGO-OOPHORECTOMY;  Surgeon: Navya Lquue MD;  Location: [facility] OR;  Service: Gynecology Oncology;  Laterality: Bilateral;    ROBOT-ASSISTED LYMPHADENECTOMY N/A 6/17/2024    Procedure: ROBOTIC LYMPHADENECTOMY;  Surgeon: Navya Luque MD;  Location: [facility] OR;  Service: Gynecology Oncology;  Laterality: N/A;     No family history on file.    The 10-year CVD risk score (CEDRIC'Agostino, et al., 2008) is: 16.6%    Values used to calculate the score:      Age: 64 years      Sex: Female      Diabetic: No      Tobacco smoker: Yes      Systolic Blood Pressure: 116 mmHg      Is BP treated: No      HDL Cholesterol: 46 mg/dL      Total Cholesterol: 302 mg/dL    Tests to Keep You Healthy    Mammogram: Met on 7/26/2024  Colon Cancer Screening: DUE  Tobacco Cessation: NO      Review of patient's allergies indicates:  No Known Allergies    Current Outpatient Medications:     cyclobenzaprine (FLEXERIL) 5 MG tablet, Take 1 tablet (5 mg total) by mouth nightly as needed for Muscle spasms (TMJ)., Disp: 20 tablet, Rfl: 2    desonide (DESOWEN) 0.05 % cream, Apply topically 2 (two) times daily., Disp: 15 g, Rfl: 2    EScitalopram oxalate (LEXAPRO) 10 MG tablet, Take 1 tablet (10 mg total) by mouth once daily., Disp: 90 tablet, Rfl: 3    rosuvastatin (CRESTOR) 10 MG tablet, Take 1 tablet (10 mg total) by mouth once daily., Disp: 90 tablet, Rfl: 3    clonazePAM (KLONOPIN) 0.5 MG tablet, Take 1 tablet (0.5 mg total) by mouth 3 (three)  "times daily as needed for Anxiety., Disp: 90 tablet, Rfl: 2    senna-docusate 8.6-50 mg (PERICOLACE) 8.6-50 mg per tablet, Take 1 tablet by mouth 2 (two) times daily. (Patient not taking: Reported on 1/14/2025), Disp: , Rfl:     Review of Systems   Constitutional:  Positive for activity change. Negative for unexpected weight change.   HENT:  Negative for hearing loss, rhinorrhea and trouble swallowing.    Eyes:  Negative for discharge and visual disturbance.   Respiratory:  Negative for chest tightness and wheezing.    Cardiovascular:  Positive for palpitations. Negative for chest pain.   Gastrointestinal:  Negative for blood in stool, constipation, diarrhea and vomiting.   Endocrine: Negative for polydipsia and polyuria.   Genitourinary:  Negative for difficulty urinating, dysuria, hematuria and menstrual problem.   Musculoskeletal:  Positive for arthralgias. Negative for joint swelling and neck pain.   Neurological:  Negative for weakness and headaches.   Psychiatric/Behavioral:  Negative for confusion and dysphoric mood.            Objective:      Vitals:    01/14/25 1404   BP: 116/64   Pulse: 97   SpO2: 100%   Weight: 73.5 kg (162 lb)   Height: 5' 1" (1.549 m)     Physical Exam  Vitals and nursing note reviewed.   Constitutional:       General: She is not in acute distress.     Appearance: Normal appearance. She is well-developed. She is not ill-appearing.   HENT:      Head: Normocephalic and atraumatic.      Right Ear: Tympanic membrane and external ear normal.      Left Ear: Tympanic membrane and external ear normal.      Nose: Nose normal.      Mouth/Throat:      Lips: Pink.      Pharynx: Oropharynx is clear.   Eyes:      General: No scleral icterus.     Pupils: Pupils are equal, round, and reactive to light.   Neck:      Thyroid: No thyromegaly.      Vascular: No carotid bruit.   Cardiovascular:      Rate and Rhythm: Normal rate and regular rhythm.      Pulses:           Radial pulses are 2+ on the right " side and 2+ on the left side.      Heart sounds: Normal heart sounds. No murmur heard.  Pulmonary:      Effort: Pulmonary effort is normal.      Breath sounds: Normal breath sounds.   Abdominal:      General: Bowel sounds are normal.      Palpations: Abdomen is soft.      Tenderness: There is no abdominal tenderness.   Musculoskeletal:         General: Normal range of motion.      Cervical back: Normal range of motion.      Lumbar back: Normal. No spasms.      Right lower leg: No edema.      Left lower leg: No edema.   Skin:     General: Skin is warm and dry.      Capillary Refill: Capillary refill takes less than 2 seconds.   Neurological:      General: No focal deficit present.      Mental Status: She is alert and oriented to person, place, and time.      Cranial Nerves: No cranial nerve deficit.      Sensory: Sensation is intact.      Motor: No weakness.      Gait: Gait is intact.   Psychiatric:         Attention and Perception: Attention normal.         Mood and Affect: Mood normal.         Speech: Speech normal.         Behavior: Behavior is cooperative.         Thought Content: Thought content does not include homicidal or suicidal ideation.       Physical Exam              Assessment:       1. Anxiety and depression    2. Panic    3. TMJ (temporomandibular joint disorder)    4. Encounter for screening for malignant neoplasm of colon    5. Mixed hyperlipidemia    6. Elevated blood-pressure reading without diagnosis of hypertension    7. Personal history of nicotine dependence         Plan:           Assessment & Plan    IMPRESSION:  - Adjusted Klonopin frequency to 3 times daily as needed, based on patient's reported improved symptoms and duration of effect  - Maintained current Klonopin dosage (0.5 mg) as patient reports significant improvement in anxiety, sleep, and overall quality of life  - Assessed blood pressure, noting improvement likely due to reduced anxiety levels  - Continued cholesterol medication,  emphasizing need for lifestyle modifications to further improve lipid profile    Anxiety and depression  Panic  -     clonazePAM (KLONOPIN) 0.5 MG tablet; Take 1 tablet (0.5 mg total) by mouth 3 (three) times daily as needed for Anxiety.  Dispense: 90 tablet; Refill: 2  ANXIETY:  - Increased Klonopin dosage to 0.5 mg 3 times daily as needed, with a maximum of 0.5mg per dose.  - Noted significant improvement in the patient's anxiety symptoms, including reduced inner body shaking, improved sleep, increased patience at work, and better control of anxiety triggers.  - Observed significant improvement in the patient's overall mood and functioning.  - Acknowledged the impact of current world events and social media on anxiety levels, especially in younger populations.  - Instructed the patient to report any side effects or concerns with the adjusted medication regimen.  DEPRESSION:  - Continued current medication regimen due to its effectiveness in treating the patient's depression.  - Noted significant improvement in depressive symptoms, including increased energy and ability to engage in activities.  - Observed significant improvement in the patient's overall mood and functioning.  - Recommend increasing physical activity, such as walking, when weather improves to boost mood and overall health.  - Advised the patient to monitor and report any changes in mood or side effects from the medication.    HYPERLIPIDEMIA:  - Continued cholesterol medication at current dose.  - Discussed hereditary factors in cholesterol levels and the potential need for medication even with a healthy lifestyle.  - Instructed the patient to implement efforts to reduce cholesterol through lifestyle changes in addition to medication.  - Ordered labs in 6 months to recheck cholesterol levels and overall health status.  - Noted that the patient reports taking cholesterol medication daily.  - Recommend walking as part of lifestyle changes for  cholesterol management.    Personal history of nicotine dependence  SMOKING CESSATION:  - Provided information on the relationship between smoking cessation and potential weight gain.  - Noted that the patient reports smoking slightly less than before.  - Observed that reduced smoking may be leading to increased eating.  - Acknowledged the need to address smoking without increasing it.  - Advised the patient to consider nicotine replacement therapy or other smoking cessation aids if interested in quitting.    TMJ (temporomandibular joint disorder)  MUSCLE SPASMS:  - Noted significant improvement in muscle spasms, particularly in the face.  - Continued use of Flexeril at night for muscle spasms.  - Instructed the patient to report any changes in the frequency or intensity of muscle spasms.  - Advised the patient to maintain proper posture and engage in gentle stretching exercises as tolerated.    WEIGHT MANAGEMENT:  - Noted that the patient reports not eating cheese.  - Observed that the patient has gained some weight back after previous weight loss.  - Recommend walking for overall health improvement and weight management.  - Advised the patient to maintain a balanced diet rich in fruits, vegetables, and whole grains.  - Suggested keeping a food diary to track dietary habits and identify areas for improvement.      Encounter for screening for malignant neoplasm of colon  -     Ambulatory referral/consult to Gastroenterology; Future; Expected date: 01/14/2025  COLONOSCOPY REFERRAL:  - Explained colonoscopy procedure, including prepar  ation process and what to expect during and after the procedure.  - Referred the patient to Dr. Nicholas for routine colon screening and colonoscopy.    Elevated blood-pressure reading without diagnosis of hypertension  - Noted improvement in the patient's blood pressure, now 124/60, likely due to reduced anxiety.  - Recommend discontinuing blood pressure medication due to  improvement.  - Advised the patient to continue monitoring blood pressure at home and report any significant changes.    LABS:  - Ordered labs in 6 months to recheck overall health status.    FOLLOW UP:  - Follow up in 6 months for labs and reassessment.         Follow up in about 6 months (around 7/14/2025) for HLD, anxiety.        This note was generated with the assistance of ambient listening technology. Verbal consent was obtained by the patient and accompanying visitor(s) for the recording of patient appointment to facilitate this note. I attest to having reviewed and edited the generated note for accuracy, though some syntax or spelling errors may persist. Please contact the author of this note for any clarification.      1/22/2025 Breana Nelson

## 2025-02-19 DIAGNOSIS — M26.609 TMJ (TEMPOROMANDIBULAR JOINT DISORDER): ICD-10-CM

## 2025-02-19 RX ORDER — CYCLOBENZAPRINE HCL 5 MG
5 TABLET ORAL NIGHTLY PRN
Qty: 20 TABLET | Refills: 2 | Status: SHIPPED | OUTPATIENT
Start: 2025-02-19

## 2025-02-19 NOTE — TELEPHONE ENCOUNTER
Last OV 1/14/2025  Upcoming OV 7/24/2025    Rx order set up. Confirmed pt would like sent to [facility] on [address].

## 2025-02-19 NOTE — TELEPHONE ENCOUNTER
----- Message from Breana sent at 2/19/2025  3:08 PM CST -----  - 2:12- pt needs refill on tmj  medication. She thinks it was flexeril [phone]

## 2025-03-28 LAB — CRC RECOMMENDATION EXT: NORMAL

## 2025-04-02 ENCOUNTER — PATIENT OUTREACH (OUTPATIENT)
Dept: ADMINISTRATIVE | Facility: HOSPITAL | Age: 65
End: 2025-04-02
Payer: COMMERCIAL

## 2025-04-15 DIAGNOSIS — M26.609 TMJ (TEMPOROMANDIBULAR JOINT DISORDER): ICD-10-CM

## 2025-04-15 DIAGNOSIS — F41.0 PANIC: ICD-10-CM

## 2025-04-15 RX ORDER — CLONAZEPAM 0.5 MG/1
0.5 TABLET ORAL 3 TIMES DAILY PRN
Qty: 90 TABLET | Refills: 2 | Status: SHIPPED | OUTPATIENT
Start: 2025-04-15 | End: 2026-04-15

## 2025-04-15 RX ORDER — CYCLOBENZAPRINE HCL 5 MG
5 TABLET ORAL NIGHTLY PRN
Qty: 20 TABLET | Refills: 2 | Status: SHIPPED | OUTPATIENT
Start: 2025-04-15

## 2025-04-15 NOTE — TELEPHONE ENCOUNTER
Pt is needing a refill on her clonazepam and flexeril. Last office visit 01/14/2025. Next office visit 07/24/2025. Chart shows last dispense 03/16/2025. No UDS on file

## 2025-04-15 NOTE — TELEPHONE ENCOUNTER
----- Message from Angelika sent at 4/15/2025 12:57 PM CDT -----  :944Pt called to get refill for clonazepam and flexeril.[phone]

## 2025-07-08 DIAGNOSIS — M26.609 TMJ (TEMPOROMANDIBULAR JOINT DISORDER): ICD-10-CM

## 2025-07-08 RX ORDER — CYCLOBENZAPRINE HCL 5 MG
5 TABLET ORAL NIGHTLY PRN
Qty: 20 TABLET | Refills: 2 | Status: SHIPPED | OUTPATIENT
Start: 2025-07-08

## 2025-07-08 NOTE — TELEPHONE ENCOUNTER
Copied from CRM #[id number]. Topic: Medications - Medication Refill  >> Jul 8, 2025 11:51 AM Verenice wrote:  Type:  RX Refill Request    Who Called: pt     Refill or New Rx:refill     RX Name and Strength:cyclobenzaprine (FLEXERIL) 5 MG tablet     How is the patient currently taking it? (ex. 1XDay):as needed     Is this a 30 day or 90 day RX:20     Preferred Pharmacy with phone number:  [id number] - [address]  [address] AT [facility] [address]  [address] [address]  Phone: [phone] Fax: [phone]    Local or Mail Order:local     Ordering Provider:Leslie      Would the patient rather a call back or a response via [facility]? Pls call if unable to fill     Best Call Back Number:[phone]     Additional Information:     Please call back to advise. Thanks!

## 2025-07-30 DIAGNOSIS — Z12.31 OTHER SCREENING MAMMOGRAM: ICD-10-CM

## 2025-07-31 ENCOUNTER — OFFICE VISIT (OUTPATIENT)
Dept: FAMILY MEDICINE | Facility: CLINIC | Age: 65
End: 2025-07-31
Payer: COMMERCIAL

## 2025-07-31 VITALS
HEART RATE: 98 BPM | DIASTOLIC BLOOD PRESSURE: 78 MMHG | SYSTOLIC BLOOD PRESSURE: 128 MMHG | BODY MASS INDEX: 33.35 KG/M2 | OXYGEN SATURATION: 98 % | WEIGHT: 176.63 LBS | HEIGHT: 61 IN

## 2025-07-31 DIAGNOSIS — Z13.0 SCREENING FOR ENDOCRINE, NUTRITIONAL, METABOLIC AND IMMUNITY DISORDER: ICD-10-CM

## 2025-07-31 DIAGNOSIS — Z87.891 PERSONAL HISTORY OF NICOTINE DEPENDENCE: ICD-10-CM

## 2025-07-31 DIAGNOSIS — Z13.29 SCREENING FOR ENDOCRINE, NUTRITIONAL, METABOLIC AND IMMUNITY DISORDER: ICD-10-CM

## 2025-07-31 DIAGNOSIS — Z13.6 ENCOUNTER FOR LIPID SCREENING FOR CARDIOVASCULAR DISEASE: ICD-10-CM

## 2025-07-31 DIAGNOSIS — Z13.220 ENCOUNTER FOR LIPID SCREENING FOR CARDIOVASCULAR DISEASE: ICD-10-CM

## 2025-07-31 DIAGNOSIS — Z13.21 SCREENING FOR ENDOCRINE, NUTRITIONAL, METABOLIC AND IMMUNITY DISORDER: ICD-10-CM

## 2025-07-31 DIAGNOSIS — F32.A ANXIETY AND DEPRESSION: ICD-10-CM

## 2025-07-31 DIAGNOSIS — Z00.00 WELLNESS EXAMINATION: Primary | ICD-10-CM

## 2025-07-31 DIAGNOSIS — Z13.228 SCREENING FOR ENDOCRINE, NUTRITIONAL, METABOLIC AND IMMUNITY DISORDER: ICD-10-CM

## 2025-07-31 DIAGNOSIS — M26.609 TMJ (TEMPOROMANDIBULAR JOINT DISORDER): ICD-10-CM

## 2025-07-31 DIAGNOSIS — F41.0 PANIC: ICD-10-CM

## 2025-07-31 DIAGNOSIS — F41.9 ANXIETY AND DEPRESSION: ICD-10-CM

## 2025-07-31 LAB — BCS RECOMMENDATION EXT: NORMAL

## 2025-07-31 PROCEDURE — 1159F MED LIST DOCD IN RCRD: CPT | Mod: CPTII,S$GLB,,

## 2025-07-31 PROCEDURE — 99406 BEHAV CHNG SMOKING 3-10 MIN: CPT | Mod: S$GLB,,,

## 2025-07-31 PROCEDURE — 3008F BODY MASS INDEX DOCD: CPT | Mod: CPTII,S$GLB,,

## 2025-07-31 PROCEDURE — 3078F DIAST BP <80 MM HG: CPT | Mod: CPTII,S$GLB,,

## 2025-07-31 PROCEDURE — 1160F RVW MEDS BY RX/DR IN RCRD: CPT | Mod: CPTII,S$GLB,,

## 2025-07-31 PROCEDURE — 99396 PREV VISIT EST AGE 40-64: CPT | Mod: 25,S$GLB,,

## 2025-07-31 PROCEDURE — 3074F SYST BP LT 130 MM HG: CPT | Mod: CPTII,S$GLB,,

## 2025-07-31 RX ORDER — ESTRADIOL 10 UG/1
1 TABLET, FILM COATED VAGINAL
COMMUNITY
Start: 2025-06-24

## 2025-07-31 RX ORDER — CYCLOBENZAPRINE HCL 5 MG
5 TABLET ORAL NIGHTLY PRN
Qty: 30 TABLET | Refills: 2 | Status: SHIPPED | OUTPATIENT
Start: 2025-07-31

## 2025-07-31 RX ORDER — CLONAZEPAM 1 MG/1
1 TABLET ORAL 3 TIMES DAILY PRN
Qty: 90 TABLET | Refills: 2 | Status: SHIPPED | OUTPATIENT
Start: 2025-07-31 | End: 2026-07-31

## 2025-07-31 NOTE — PATIENT INSTRUCTIONS
Pascual King,     If you are due for any health screening(s) below please notify me so we can arrange them to be ordered and scheduled. Most healthy patients at your age complete them, but you are free to accept or refuse.     If you can't do it, I'll definitely understand. If you can, I'd certainly appreciate it!    Tests to Keep You Healthy    Mammogram: ORDERED BUT NOT SCHEDULED  Colon Cancer Screening: Met on 3/28/2025  Tobacco Cessation: NO      Schedule your breast cancer screening today     Breast cancer is the second most common cancer in women,  and the second leading cause of death from cancer. Mammograms can detect breast cancer early, which significantly increases the chances of curing the cancer.       Our records indicate that you may be overdue for breast cancer screening. Cancer screenings save lives, so schedule yours today to stay healthy.     If you recently had a mammogram performed outside of [facility], please let your Health care team know so that they can update your health record.        Were here to help you quit smoking     Our records indicated that you are still smoking. One of the best things you can do for your health is to stop smoking and we are here to help.     Talk with your provider about our Smoking Cessation Program and how we can support you on your journey.             Smoking is one of the hardest habits to break. About half of all those who have ever smoked have been able to quit, and most of those (about 70%) who still smoke want to quit. Here are some of the best ways to stop smoking.    KEEP TRYING:  It takes most smokers about 8 tries before they are finally able to fully quit. So, the more often you try and fail, the better your chance of quitting the next time! So, don't give up!  GO COLD TURKEY:  Most ex-smokers quit cold turkey. Trying to cut back gradually doesn't seem to work as well, perhaps because it continues the smoking habit. Also, it is possible to  fool yourself by inhaling more while smoking fewer cigarettes. This results in the same amount of nicotine in your body!  GET SUPPORT:  Support programs can make an important difference, especially for the heavy smoker. These groups offer lectures, methods to change your behavior and peer support. Call the free national Quitline for more information. 800-QUIT-NOW (253-452-8969). Low-cost or free programs are offered by many hospitals, local chapters of the American Lung Association (894-121-8512) and the American Cancer Society (262-933-5692). Support at home is important too. Non-smokers can help by offering praise and encouragement. If the smoker fails to quit, encourage them to try again!  OVER-THE-COUNTER MEDICINES:  For those who can't quit on their own, Nicotine Replacement Therapy (NRT) may make quitting much easier. Certain aids such as the nicotine patch, gum and lozenge are available without a prescription. However, it is best to use these under the guidance of your doctor. The skin patch provides a steady supply of nicotine to the body. Nicotine gum and lozenge gives temporary bursts of low levels of nicotine. Both methods take the edge off the craving for cigarettes. WARNING: If you feel symptoms of nicotine overdose, such as nausea, vomiting, dizziness, weakness, or fast heartbeat, stop using these and see your doctor.  PRESCRIPTION MEDICINES:  After evaluating your smoking patterns and prior attempts at quitting, your doctor may offer a prescription medicine such as bupropion (Zyban, Wellbutrin), varenicline (Chantix, Champix), a niocotine inhaler or nasal spray. Each has its unique advantage and side effects which your doctor can review with you.  HEALTH BENEFITS OF QUITTING:  The benefits of quitting start right away and keep improving the longer you go without smokin minutes: blood pressure and pulse return to normal  8 hours: oxygen levels return to normal  2 days: ability to smell and taste  begins to improve as damaged nerves start to regrow  2-3 weeks: circulation and lung function improves  1-9 months: decreased cough, congestion and shortness of breath; less tired  1 year: risk of heart attack decreases by half  5 years: risk of lung cancer decreases by half; risk of stroke becomes the same as a non-smoker

## 2025-08-03 NOTE — PROGRESS NOTES
SUBJECTIVE:    Patient ID: Fernando Parmar is a 64 y.o. female.    Chief Complaint: Follow-up (No bottles// Pt is here for a follow up and medication refills. Pt has mammo schedule for 07/31/2025// KMS)    Follow-up  Associated symptoms include arthralgias. Pertinent negatives include no chest pain, headaches, joint swelling, neck pain, vomiting or weakness.     History of Present Illness    CHIEF COMPLAINT:  Fernando presents today for follow up of anxiety.    ANXIETY:  She continues Lexapro 10 mg for anxiety management, which she feels is currently effective. She uses Clonazepam up to 3 times daily as needed. She reports new morning jitters that were previously absent, indicating potential return of anxiety symptoms. She is uncertain about medication dosage adjustment and is monitoring her response to current treatment regimen.    SLEEP:  She reports overall good sleep. She occasionally uses Clonazepam 0.5 mg when experiencing difficulty falling asleep and tossing and turning, though notes that sleep is not consistently restful when using Clonazepam.    TMJ:  She reports improvement in TMJ condition with ability to extend time without medication. However, she continues to experience daytime teeth grinding that has become more pronounced after returning to work. She expresses concern about potential dental damage and reports actively catching herself grinding teeth, particularly during work-related stress. She notes increased jaw tension in the past two days since resuming work.    GYNECOLOGICAL CARE:  She is currently under the care of Dr. Jordan and was recently initiated on estrogen therapy. She recently underwent pap smear with tissue biopsy. Biopsy results indicated low tissue levels with recommendation to return for follow up in twelve months.    TOBACCO USE:  She reports significant reduction in tobacco use and is cutting back substantially from previous smoking habits. She expresses strong motivation to  quit completely and continues to work on reducing nicotine consumption independently without cessation aids or medications.    DIET:  She reports consuming [employer] protein drinks regularly and enjoys the variety of flavors.      ROS:  General: -fever, -chills, -fatigue, -weight gain, -weight loss  Eyes: -vision changes, -redness, -discharge  ENT: -ear pain, -nasal congestion, -sore throat  Cardiovascular: -chest pain, -palpitations, -lower extremity edema  Respiratory: -cough, -shortness of breath  Gastrointestinal: -abdominal pain, -nausea, -vomiting, -diarrhea, -constipation, -blood in stool  Genitourinary: -dysuria, -hematuria, -frequency  Musculoskeletal: -joint pain, -muscle pain, +facial pain, +teeth problems  Skin: -rash, -lesion  Neurological: -headache, -dizziness, -numbness, -tingling  Psychiatric: +anxiety, -depression, +sleep difficulty, +nervousness, +difficulty staying asleep, +difficulty falling asleep         Patient Outreach on 2025   Component Date Value Ref Range Status    CRC Recommendation External 2025 Repeat colonoscopy in 10 years   Final       Past Medical History:   Diagnosis Date    Anxiety     HGSIL (high grade squamous intraepithelial lesion) on Pap smear of cervix     Hyperlipidemia      Social History[1]  Past Surgical History:   Procedure Laterality Date    ACDF      CERVICAL BIOPSY  W/ LOOP ELECTRODE EXCISION       SECTION      2    COLONOSCOPY  2025    CYSTOSCOPY N/A 2024    Procedure: CYSTOSCOPY;  Surgeon: Navya Luque MD;  Location: [facility] OR;  Service: Gynecology Oncology;  Laterality: N/A;    FRACTURE SURGERY  2019    Ankle    HYSTERECTOMY  2024    OPEN REDUCTION AND INTERNAL FIXATION (ORIF) OF INJURY OF ANKLE Left     ROBOT-ASSISTED LAPAROSCOPIC HYSTERECTOMY N/A 2024    Procedure: ROBOTIC HYSTERECTOMY;  Surgeon: Navya Luque MD;  Location: [facility] OR;  Service: Gynecology Oncology;  Laterality: N/A;    ROBOT-ASSISTED  LAPAROSCOPIC SALPINGO-OOPHORECTOMY Bilateral 06/17/2024    Procedure: ROBOTIC SALPINGO-OOPHORECTOMY;  Surgeon: Navya Luque MD;  Location: [facility] OR;  Service: Gynecology Oncology;  Laterality: Bilateral;    ROBOT-ASSISTED LYMPHADENECTOMY N/A 06/17/2024    Procedure: ROBOTIC LYMPHADENECTOMY;  Surgeon: Navya Luque MD;  Location: [facility] OR;  Service: Gynecology Oncology;  Laterality: N/A;     Family History   Problem Relation Name Age of Onset    Depression Mother Nel     Drug abuse Son Julio     Early death Son Julio        The 10-year CVD risk score (Liliana et al., 2008) is: 21.2%    Values used to calculate the score:      Age: 64 years      Sex: Female      Diabetic: No      Tobacco smoker: Yes      Systolic Blood Pressure: 128 mmHg      Is BP treated: No      HDL Cholesterol: 46 mg/dL      Total Cholesterol: 302 mg/dL    Tests to Keep You Healthy    Mammogram: ORDERED BUT NOT SCHEDULED  Colon Cancer Screening: Met on 3/28/2025  Tobacco Cessation: NO      Review of patient's allergies indicates:  No Known Allergies  Current Medications[2]    Review of Systems   Constitutional:  Positive for unexpected weight change. Negative for activity change.   HENT:  Negative for hearing loss, rhinorrhea and trouble swallowing.    Eyes:  Negative for discharge and visual disturbance.   Respiratory:  Negative for chest tightness and wheezing.    Cardiovascular:  Negative for chest pain and palpitations.   Gastrointestinal:  Negative for blood in stool, constipation, diarrhea and vomiting.   Endocrine: Negative for polydipsia and polyuria.   Genitourinary:  Negative for difficulty urinating, dysuria, hematuria and menstrual problem.   Musculoskeletal:  Positive for arthralgias. Negative for joint swelling and neck pain.   Neurological:  Negative for weakness and headaches.   Psychiatric/Behavioral:  Negative for confusion and dysphoric mood.            Objective:      Vitals:    07/31/25 0952   BP: 128/78  "  Pulse: 98   SpO2: 98%   Weight: 80.1 kg (176 lb 9.6 oz)   Height: 5' 1" (1.549 m)     Physical Exam  Vitals and nursing note reviewed.   Constitutional:       General: She is not in acute distress.     Appearance: Normal appearance. She is well-developed. She is not ill-appearing.   HENT:      Head: Normocephalic and atraumatic.      Right Ear: Tympanic membrane and external ear normal.      Left Ear: Tympanic membrane and external ear normal.      Nose: Nose normal.      Mouth/Throat:      Lips: Pink.      Pharynx: Oropharynx is clear.   Eyes:      General: No scleral icterus.     Pupils: Pupils are equal, round, and reactive to light.   Neck:      Thyroid: No thyromegaly.      Vascular: No carotid bruit.   Cardiovascular:      Rate and Rhythm: Normal rate and regular rhythm.      Pulses:           Radial pulses are 2+ on the right side and 2+ on the left side.      Heart sounds: Normal heart sounds. No murmur heard.  Pulmonary:      Effort: Pulmonary effort is normal.      Breath sounds: Normal breath sounds.   Abdominal:      General: Bowel sounds are normal.      Palpations: Abdomen is soft.      Tenderness: There is no abdominal tenderness.   Musculoskeletal:         General: Normal range of motion.      Cervical back: Normal range of motion.      Lumbar back: Normal. No spasms.      Right lower leg: No edema.      Left lower leg: No edema.   Skin:     General: Skin is warm and dry.      Capillary Refill: Capillary refill takes less than 2 seconds.   Neurological:      General: No focal deficit present.      Mental Status: She is alert and oriented to person, place, and time.      Cranial Nerves: No cranial nerve deficit.      Sensory: Sensation is intact.      Motor: No weakness.      Gait: Gait is intact.   Psychiatric:         Attention and Perception: Attention normal.         Mood and Affect: Mood normal.         Speech: Speech normal.         Behavior: Behavior is cooperative.         Thought Content: " Thought content does not include homicidal or suicidal ideation.                    Assessment:       1. Wellness examination    2. Screening for endocrine, nutritional, metabolic and immunity disorder    3. Encounter for lipid screening for cardiovascular disease    4. Panic    5. Anxiety and depression    6. TMJ (temporomandibular joint disorder)    7. Personal history of nicotine dependence         Plan:       Wellness examination  -     CBC Auto Differential; Future; Expected date: 07/31/2025  -     Hemoglobin A1C; Future; Expected date: 07/31/2025  -     Lipid Panel; Future; Expected date: 07/31/2025  -     Comprehensive Metabolic Panel; Future; Expected date: 07/31/2025  -     T4, Free; Future; Expected date: 07/31/2025  -     TSH; Future; Expected date: 07/31/2025    Screening for endocrine, nutritional, metabolic and immunity disorder  -     Hemoglobin A1C; Future; Expected date: 07/31/2025    Encounter for lipid screening for cardiovascular disease  -     Lipid Panel; Future; Expected date: 07/31/2025        GENERALIZED ANXIETY DISORDER:  - Fernando's anxiety management has improved with current Lexapro 10 mg daily, which will be continued despite discussion about increasing to 20 mg.  - Increased Clonazepam to 1 mg due to increasing anxiety symptoms, with instructions to take half dose when full dose is not needed.  - Fernando reports anxiety is well-managed and was able to go on vacation.  - Advised that Clonazepam can be taken up to 3 times daily as needed.    PRIMARY INSOMNIA:  - Fernando reports sleeping adequately, occasionally using Clonazepam at night to aid with sleep.    TEMPOROMANDIBULAR JOINT DISORDER:  - TMJ symptoms have improved, though patient still catches themselves grinding teeth, especially after returning to work.  - Continued TMJ medication with prescription refills for ongoing management.    NICOTINE DEPENDENCE:  - Fernando reports significant reduction in nicotine use.  - Discussed options for  complete cessation, including potential medication or patches.  - Will revisit smoking cessation strategies when patient feels ready.  Patient counseled on smoking cessation. I discussed options such as nicotine replacement products, wellbutrin, and chantix.  Side effects, benefits and risks were discussed regarding each.  Printed materials were given.  I offered a referral to [facility] smoking cessation program.  All questions were answered. 3-10 MINUTES OF COUNSELING GIVEN      ABNORMAL CERVICAL CYTOLOGY:  - Fernando had a pap smear and tissue biopsy with levels low enough to warrant follow up in 12 months.  - Scheduled GYN follow-up based on Dr. Jordan's assessment.    HORMONE REPLACEMENT THERAPY:  - Fernando continues hormone replacement therapy with estrogen prescribed by Dr. Jordan.  - Ongoing estrogen treatment will be based on Dr. Jordan's assessment.    GENERAL HEALTH MAINTENANCE:  - Ordered mammogram and cholesterol screening to be done in November.  - Fernando instructed to come to the office for lab work any morning Monday through Thursday at 7:30 AM, or Friday at 8:00 AM, fasting.    FOLLOW-UP:  - Follow up in 6 months.         Follow up in about 6 months (around 1/31/2026) for anxiety, HLD.        This note was generated with the assistance of ambient listening technology. Verbal consent was obtained by the patient and accompanying visitor(s) for the recording of patient appointment to facilitate this note. I attest to having reviewed and edited the generated note for accuracy, though some syntax or spelling errors may persist. Please contact the author of this note for any clarification.      8/2/2025 Breana Nelson         [1]   Social History  Socioeconomic History    Marital status:    Tobacco Use    Smoking status: Every Day     Current packs/day: 0.25     Average packs/day: 0.3 packs/day for 45.6 years (11.4 ttl pk-yrs)     Types: Cigarettes     Start date: 1/1/1980    Smokeless tobacco: Never    Substance and Sexual Activity    Alcohol use: Not Currently    Drug use: Never    Sexual activity: Not Currently     Partners: Female     Birth control/protection: Abstinence     Social Drivers of Health     Financial Resource Strain: Medium Risk (1/7/2025)    Overall Financial Resource Strain (CARDIA)     Difficulty of Paying Living Expenses: Somewhat hard   Food Insecurity: No Food Insecurity (1/7/2025)    Hunger Vital Sign     Worried About Running Out of Food in the Last Year: Never true     Ran Out of Food in the Last Year: Never true   Physical Activity: Inactive (1/7/2025)    Exercise Vital Sign     Days of Exercise per Week: 0 days     Minutes of Exercise per Session: 0 min   Stress: Stress Concern Present (1/7/2025)    [employer] - Occupational Stress Questionnaire     Feeling of Stress : To some extent   Housing Stability: High Risk (1/7/2025)    Housing Stability Vital Sign     Unable to Pay for Housing in the Last Year: Yes   [2]   Current Outpatient Medications:     estradioL (VAGIFEM) 10 mcg Tab, Place 1 tablet vaginally twice a week., Disp: , Rfl:     clonazePAM (KLONOPIN) 1 MG tablet, Take 1 tablet (1 mg total) by mouth 3 (three) times daily as needed for Anxiety (take a half to one tablet three times a day as needed for severe anxiety/panic)., Disp: 90 tablet, Rfl: 2    cyclobenzaprine (FLEXERIL) 5 MG tablet, Take 1 tablet (5 mg total) by mouth nightly as needed for Muscle spasms (TMJ)., Disp: 30 tablet, Rfl: 2    desonide (DESOWEN) 0.05 % cream, Apply topically 2 (two) times daily., Disp: 15 g, Rfl: 2    EScitalopram oxalate (LEXAPRO) 10 MG tablet, Take 1 tablet (10 mg total) by mouth once daily., Disp: 90 tablet, Rfl: 3    rosuvastatin (CRESTOR) 10 MG tablet, Take 1 tablet (10 mg total) by mouth once daily., Disp: 90 tablet, Rfl: 3    senna-docusate 8.6-50 mg (PERICOLACE) 8.6-50 mg per tablet, Take 1 tablet by mouth 2 (two) times daily. (Patient not taking: Reported  on 1/14/2025), Disp: , Rfl:

## 2025-08-05 ENCOUNTER — PATIENT OUTREACH (OUTPATIENT)
Dept: ADMINISTRATIVE | Facility: HOSPITAL | Age: 65
End: 2025-08-05
Payer: COMMERCIAL